# Patient Record
Sex: FEMALE | Race: WHITE | NOT HISPANIC OR LATINO | Employment: UNEMPLOYED | URBAN - METROPOLITAN AREA
[De-identification: names, ages, dates, MRNs, and addresses within clinical notes are randomized per-mention and may not be internally consistent; named-entity substitution may affect disease eponyms.]

---

## 2017-01-04 ENCOUNTER — PATIENT MESSAGE (OUTPATIENT)
Dept: HEMATOLOGY/ONCOLOGY | Facility: CLINIC | Age: 57
End: 2017-01-04

## 2017-04-13 ENCOUNTER — OFFICE VISIT (OUTPATIENT)
Dept: HEMATOLOGY/ONCOLOGY | Facility: CLINIC | Age: 57
End: 2017-04-13
Payer: COMMERCIAL

## 2017-04-13 ENCOUNTER — LAB VISIT (OUTPATIENT)
Dept: LAB | Facility: HOSPITAL | Age: 57
End: 2017-04-13
Payer: COMMERCIAL

## 2017-04-13 VITALS
WEIGHT: 151.44 LBS | DIASTOLIC BLOOD PRESSURE: 73 MMHG | SYSTOLIC BLOOD PRESSURE: 131 MMHG | BODY MASS INDEX: 25.85 KG/M2 | TEMPERATURE: 99 F | HEART RATE: 62 BPM | HEIGHT: 64 IN

## 2017-04-13 DIAGNOSIS — C91.10 CLL (CHRONIC LYMPHOCYTIC LEUKEMIA): Primary | ICD-10-CM

## 2017-04-13 DIAGNOSIS — C91.10 CLL (CHRONIC LYMPHOCYTIC LEUKEMIA): ICD-10-CM

## 2017-04-13 LAB
ALBUMIN SERPL BCP-MCNC: 3.8 G/DL
ALP SERPL-CCNC: 92 U/L
ALT SERPL W/O P-5'-P-CCNC: 14 U/L
ANION GAP SERPL CALC-SCNC: 7 MMOL/L
ANISOCYTOSIS BLD QL SMEAR: SLIGHT
AST SERPL-CCNC: 18 U/L
BASOPHILS # BLD AUTO: ABNORMAL K/UL
BASOPHILS NFR BLD: 0 %
BILIRUB SERPL-MCNC: 0.6 MG/DL
BUN SERPL-MCNC: 18 MG/DL
CALCIUM SERPL-MCNC: 9.6 MG/DL
CHLORIDE SERPL-SCNC: 106 MMOL/L
CO2 SERPL-SCNC: 26 MMOL/L
CREAT SERPL-MCNC: 0.8 MG/DL
DIFFERENTIAL METHOD: ABNORMAL
EOSINOPHIL # BLD AUTO: ABNORMAL K/UL
EOSINOPHIL NFR BLD: 0 %
ERYTHROCYTE [DISTWIDTH] IN BLOOD BY AUTOMATED COUNT: 14.1 %
EST. GFR  (AFRICAN AMERICAN): >60 ML/MIN/1.73 M^2
EST. GFR  (NON AFRICAN AMERICAN): >60 ML/MIN/1.73 M^2
GLUCOSE SERPL-MCNC: 83 MG/DL
HCT VFR BLD AUTO: 36.6 %
HGB BLD-MCNC: 12.6 G/DL
LDH SERPL L TO P-CCNC: 159 U/L
LYMPHOCYTES # BLD AUTO: ABNORMAL K/UL
LYMPHOCYTES NFR BLD: 70 %
MCH RBC QN AUTO: 30.7 PG
MCHC RBC AUTO-ENTMCNC: 34.4 %
MCV RBC AUTO: 89 FL
MONOCYTES # BLD AUTO: ABNORMAL K/UL
MONOCYTES NFR BLD: 2 %
NEUTROPHILS NFR BLD: 28 %
PATH REV BLD -IMP: NORMAL
PLATELET # BLD AUTO: 232 K/UL
PMV BLD AUTO: 8.9 FL
POIKILOCYTOSIS BLD QL SMEAR: SLIGHT
POLYCHROMASIA BLD QL SMEAR: ABNORMAL
POTASSIUM SERPL-SCNC: 3.7 MMOL/L
PROT SERPL-MCNC: 6.6 G/DL
RBC # BLD AUTO: 4.11 M/UL
SMUDGE CELLS BLD QL SMEAR: PRESENT
SODIUM SERPL-SCNC: 139 MMOL/L
WBC # BLD AUTO: 14.76 K/UL

## 2017-04-13 PROCEDURE — 83615 LACTATE (LD) (LDH) ENZYME: CPT

## 2017-04-13 PROCEDURE — 1160F RVW MEDS BY RX/DR IN RCRD: CPT | Mod: S$GLB,,, | Performed by: INTERNAL MEDICINE

## 2017-04-13 PROCEDURE — 85007 BL SMEAR W/DIFF WBC COUNT: CPT

## 2017-04-13 PROCEDURE — 85060 BLOOD SMEAR INTERPRETATION: CPT | Mod: ,,, | Performed by: PATHOLOGY

## 2017-04-13 PROCEDURE — 36415 COLL VENOUS BLD VENIPUNCTURE: CPT

## 2017-04-13 PROCEDURE — 85027 COMPLETE CBC AUTOMATED: CPT

## 2017-04-13 PROCEDURE — 99213 OFFICE O/P EST LOW 20 MIN: CPT | Mod: S$GLB,,, | Performed by: INTERNAL MEDICINE

## 2017-04-13 PROCEDURE — 80053 COMPREHEN METABOLIC PANEL: CPT

## 2017-04-13 PROCEDURE — 99999 PR PBB SHADOW E&M-EST. PATIENT-LVL II: CPT | Mod: PBBFAC,,, | Performed by: INTERNAL MEDICINE

## 2017-04-13 NOTE — PROGRESS NOTES
Subjective:       Patient ID: Amy Byrd is a 56 y.o. female.    Chief Complaint: No chief complaint on file.    HPI  Ms. Byrd is here for follow up of CLL.      History: She had been referred to an outside hematologist after routine CBC demonstrated elevated WBC with a predominance of lymphocytes. WBC on 11/15/16 showed total 21,800 with 70% lymphocytes = ALC of 15,260.  She had a normal hemoglobin, platelet count and ANC.  She had peripheral blood flow cytometry done which was positive for CD5 monoclonal B cell population with findings consistent with CLL.     Flow cytometry here on 12/21/16 showed a kappa light chain restricted B-cell population with expression of CD 19/20/5/23 consistent with CLL.  FISH studies the same day showed 13q deletion in 25% of nuclei, a favorable prognostic sign.  Beta2 microglobulin was 1.9 which is also favorable.     Review of Systems   Constitutional: Negative for activity change, appetite change, diaphoresis, fatigue, fever and unexpected weight change.   HENT: Negative for hearing loss and sore throat.    Respiratory: Negative for cough and shortness of breath.    Cardiovascular: Negative for chest pain and leg swelling.   Gastrointestinal: Negative for abdominal pain and diarrhea.   Genitourinary: Negative for dysuria and hematuria.   Musculoskeletal: Negative for back pain and neck pain.   Skin: Negative for rash.   Neurological: Negative for tremors, numbness and headaches.   Hematological: Negative for adenopathy.   Psychiatric/Behavioral: Negative for confusion and sleep disturbance.       Objective:      Physical Exam   Constitutional: She is oriented to person, place, and time. She appears well-developed and well-nourished. No distress.   HENT:   Head: Normocephalic and atraumatic.   Mouth/Throat: No oropharyngeal exudate.   Eyes: Conjunctivae are normal. Pupils are equal, round, and reactive to light.   Neck: Neck supple.   Cardiovascular: Normal rate and  regular rhythm.    Pulmonary/Chest: Effort normal and breath sounds normal. She has no rales.   Abdominal: Soft. Bowel sounds are normal. She exhibits no mass. There is no splenomegaly. There is no tenderness.   Musculoskeletal: Normal range of motion. She exhibits no edema.   Lymphadenopathy:     She has no cervical adenopathy.     She has no axillary adenopathy.        Right: No inguinal adenopathy present.        Left: No inguinal adenopathy present.   Neurological: She is alert and oriented to person, place, and time.   Skin: Skin is warm and dry. No rash noted.   Psychiatric: She has a normal mood and affect. Thought content normal.       Assessment:       1. CLL (chronic lymphocytic leukemia)        Plan:       Ms. Byrd has stage 0 CLL and excellent prognosis based on having 13q abnormality and low beta2 microglobulin level.  She has no adenopathy or organomegaly on exam today and her hemoglobin and platelet count are normal with ALC 10.4K (down from 15.3K in 12/2016).  She is felling well and fully active without new symptoms.    I will see her back in 6 months and all of her questions were answered in the clinic today.

## 2017-09-19 ENCOUNTER — TELEPHONE (OUTPATIENT)
Dept: HEMATOLOGY/ONCOLOGY | Facility: CLINIC | Age: 57
End: 2017-09-19

## 2017-09-19 NOTE — TELEPHONE ENCOUNTER
----- Message from Marian Cuellar sent at 9/18/2017  4:31 PM CDT -----  Contact: pt  Pt contact [phone]    Pt missed a call and she believes it's in reference to her appts for lab on Friday 09/22    Please call pt

## 2017-09-19 NOTE — TELEPHONE ENCOUNTER
Returned call  to patient. I did not previously place a call to her. It was probably the .  Left voicemail to call us back if she needs to reschedule appointments or for any other questions or concerns.

## 2017-09-22 ENCOUNTER — OFFICE VISIT (OUTPATIENT)
Dept: HEMATOLOGY/ONCOLOGY | Facility: CLINIC | Age: 57
End: 2017-09-22
Payer: COMMERCIAL

## 2017-09-22 ENCOUNTER — LAB VISIT (OUTPATIENT)
Dept: LAB | Facility: HOSPITAL | Age: 57
End: 2017-09-22
Attending: INTERNAL MEDICINE
Payer: COMMERCIAL

## 2017-09-22 VITALS
DIASTOLIC BLOOD PRESSURE: 73 MMHG | HEART RATE: 67 BPM | HEIGHT: 64 IN | BODY MASS INDEX: 27.36 KG/M2 | SYSTOLIC BLOOD PRESSURE: 108 MMHG | WEIGHT: 160.25 LBS | TEMPERATURE: 98 F

## 2017-09-22 DIAGNOSIS — C91.10 CLL (CHRONIC LYMPHOCYTIC LEUKEMIA): ICD-10-CM

## 2017-09-22 DIAGNOSIS — F41.9 ANXIETY: ICD-10-CM

## 2017-09-22 DIAGNOSIS — C91.10 CLL (CHRONIC LYMPHOCYTIC LEUKEMIA): Primary | ICD-10-CM

## 2017-09-22 LAB
ALBUMIN SERPL BCP-MCNC: 3.8 G/DL
ALP SERPL-CCNC: 91 U/L
ALT SERPL W/O P-5'-P-CCNC: 18 U/L
ANION GAP SERPL CALC-SCNC: 6 MMOL/L
ANISOCYTOSIS BLD QL SMEAR: SLIGHT
AST SERPL-CCNC: 23 U/L
BASOPHILS # BLD AUTO: ABNORMAL K/UL
BASOPHILS NFR BLD: 0 %
BILIRUB SERPL-MCNC: 0.5 MG/DL
BUN SERPL-MCNC: 16 MG/DL
CALCIUM SERPL-MCNC: 9.4 MG/DL
CHLORIDE SERPL-SCNC: 107 MMOL/L
CO2 SERPL-SCNC: 26 MMOL/L
CREAT SERPL-MCNC: 0.9 MG/DL
DIFFERENTIAL METHOD: ABNORMAL
EOSINOPHIL # BLD AUTO: ABNORMAL K/UL
EOSINOPHIL NFR BLD: 1 %
ERYTHROCYTE [DISTWIDTH] IN BLOOD BY AUTOMATED COUNT: 13.9 %
EST. GFR  (AFRICAN AMERICAN): >60 ML/MIN/1.73 M^2
EST. GFR  (NON AFRICAN AMERICAN): >60 ML/MIN/1.73 M^2
GLUCOSE SERPL-MCNC: 97 MG/DL
HCT VFR BLD AUTO: 38 %
HGB BLD-MCNC: 13 G/DL
LDH SERPL L TO P-CCNC: 171 U/L
LYMPHOCYTES # BLD AUTO: ABNORMAL K/UL
LYMPHOCYTES NFR BLD: 81 %
MCH RBC QN AUTO: 30.6 PG
MCHC RBC AUTO-ENTMCNC: 34.2 G/DL
MCV RBC AUTO: 89 FL
MONOCYTES # BLD AUTO: ABNORMAL K/UL
MONOCYTES NFR BLD: 0 %
NEUTROPHILS NFR BLD: 18 %
PLATELET # BLD AUTO: 232 K/UL
PLATELET BLD QL SMEAR: ABNORMAL
PMV BLD AUTO: 8.8 FL
POTASSIUM SERPL-SCNC: 4.7 MMOL/L
PROT SERPL-MCNC: 6.8 G/DL
RBC # BLD AUTO: 4.25 M/UL
SODIUM SERPL-SCNC: 139 MMOL/L
WBC # BLD AUTO: 15.04 K/UL

## 2017-09-22 PROCEDURE — 99999 PR PBB SHADOW E&M-EST. PATIENT-LVL III: CPT | Mod: PBBFAC,,, | Performed by: INTERNAL MEDICINE

## 2017-09-22 PROCEDURE — 80053 COMPREHEN METABOLIC PANEL: CPT

## 2017-09-22 PROCEDURE — 85027 COMPLETE CBC AUTOMATED: CPT

## 2017-09-22 PROCEDURE — 36415 COLL VENOUS BLD VENIPUNCTURE: CPT

## 2017-09-22 PROCEDURE — 85007 BL SMEAR W/DIFF WBC COUNT: CPT

## 2017-09-22 PROCEDURE — 99213 OFFICE O/P EST LOW 20 MIN: CPT | Mod: S$GLB,,, | Performed by: INTERNAL MEDICINE

## 2017-09-22 PROCEDURE — 83615 LACTATE (LD) (LDH) ENZYME: CPT

## 2017-09-22 PROCEDURE — 3008F BODY MASS INDEX DOCD: CPT | Mod: S$GLB,,, | Performed by: INTERNAL MEDICINE

## 2017-09-22 RX ORDER — ALPRAZOLAM 0.25 MG/1
0.25 TABLET ORAL 2 TIMES DAILY PRN
Qty: 30 TABLET | Refills: 0 | Status: SHIPPED | OUTPATIENT
Start: 2017-09-22 | End: 2018-09-22

## 2017-09-22 NOTE — LETTER
September 22, 2017        Jono Guerra MD  [address] [facility]  [place] [address]             [facility]  [address]  [place] [address]  Phone: [phone]   Patient: Amy Byrd   MR Number: [id number]   YOB: 1960   Date of Visit: 9/22/2017       Dear Dr. Guerra:    Thank you for referring Amy Byrd to me for evaluation. Below are the relevant portions of my assessment and plan of care.        1. CLL (chronic lymphocytic leukemia)          Ms. Byrd has stage 0 CLL and excellent prognosis based on having 13q abnormality (FISH from 12/2016) and low beta2 microglobulin level (1.9 on 12/21/16).  She has no adenopathy or organomegaly on exam today and her hemoglobin and platelet count are normal with ALC 12K (down from 15.3K in 12/2016).  She is felling well and fully active without new symptoms.    She has an upcoming ocean cruise that is causing her much anxiety and asked for a small prescription of Xanax 0.25 mg which I provided.    I will see her back in 6 months and all of her questions were answered in the clinic today.      If you have questions, please do not hesitate to call me. I look forward to following Amy along with you.    Sincerely,      Joe Oconnell MD           CC  No Recipients

## 2017-09-22 NOTE — PROGRESS NOTES
Subjective:       Patient ID: Amy Byrd is a 56 y.o. female.    Chief Complaint: No chief complaint on file.    HPI  Ms. Byrd is here for follow up of stag 0 CLL.  She has not developed any new medical issues since her visit 6 months ago.    History: She had been referred to an outside hematologist after routine CBC demonstrated elevated WBC with a predominance of lymphocytes. WBC on 11/15/16 showed total 21,800 with 70% lymphocytes = ALC of 15,260.  She had a normal hemoglobin, platelet count and ANC.  She had peripheral blood flow cytometry done which was positive for CD5 monoclonal B cell population with findings consistent with CLL.     Flow cytometry here on 12/21/16 showed a kappa light chain restricted B-cell population with expression of CD 19/20/5/23 consistent with CLL.  FISH studies the same day showed 13q deletion in 25% of nuclei, a favorable prognostic sign.  Beta2 microglobulin was 1.9 which is also favorable.     Review of Systems   Constitutional: Negative for activity change, appetite change, diaphoresis, fatigue, fever and unexpected weight change.   HENT: Negative for hearing loss and sore throat.    Respiratory: Negative for cough and shortness of breath.    Cardiovascular: Negative for chest pain and leg swelling.   Gastrointestinal: Negative for abdominal pain and diarrhea.   Genitourinary: Negative for dysuria and hematuria.   Musculoskeletal: Negative for back pain and neck pain.   Skin: Negative for rash.   Neurological: Negative for tremors, numbness and headaches.   Hematological: Negative for adenopathy.   Psychiatric/Behavioral: Negative for confusion and sleep disturbance.       Objective:      Physical Exam   Constitutional: She is oriented to person, place, and time. She appears well-developed and well-nourished. No distress.   HENT:   Head: Normocephalic and atraumatic.   Mouth/Throat: No oropharyngeal exudate.   Eyes: Conjunctivae are normal. Pupils are equal, round,  and reactive to light.   Neck: Neck supple.   Cardiovascular: Normal rate and regular rhythm.    Pulmonary/Chest: Effort normal and breath sounds normal. She has no rales.   Abdominal: Soft. Bowel sounds are normal. She exhibits no mass. There is no splenomegaly. There is no tenderness.   Musculoskeletal: Normal range of motion. She exhibits no edema.   Lymphadenopathy:     She has no cervical adenopathy.     She has no axillary adenopathy.        Right: No inguinal adenopathy present.        Left: No inguinal adenopathy present.   Neurological: She is alert and oriented to person, place, and time.   Skin: Skin is warm and dry. No rash noted.   Psychiatric: She has a normal mood and affect. Thought content normal.       Assessment:       1. CLL (chronic lymphocytic leukemia)        Plan:       Ms. Byrd has stage 0 CLL and excellent prognosis based on having 13q abnormality (FISH from 12/2016) and low beta2 microglobulin level (1.9 on 12/21/16).  She has no adenopathy or organomegaly on exam today and her hemoglobin and platelet count are normal with ALC 12K (down from 15.3K in 12/2016).  She is felling well and fully active without new symptoms.    She has an upcoming ocean cruise that is causing her much anxiety and asked for a small prescription of Xanax 0.25 mg which I provided.    I will see her back in 6 months and all of her questions were answered in the clinic today.